# Patient Record
(demographics unavailable — no encounter records)

---

## 2024-11-05 NOTE — HISTORY OF PRESENT ILLNESS
[FreeTextEntry1] : Patient is 46 years old para 2-0-0-2 whose  LMP was 10/23/2024.Patient has been cared for by Dr. Arguelles.  Patient reported normal menstruation until this year when her periods became heavier and also the interval at time can also be shorter. Patient reported no pain  Patient had an IUD 6/2024 mirena.  She is no longer anemic. An endometrial biopsy was done 4/11/2024 showing scant strips of inactive endometrium and predominant fresh blood  A sonogram 6/10/24 showed a uterus 9.1x6.2x6.1cm and a 4x3.7x3.8cm size mass that appeared to be intramural fibroid vs submucosal fibroid.  Ovaries were wnl .  Small amount of fluid was around the uterus.  HCM Mammogram 2024 Pap smear 2024 NILM HPV- Colonoscopy 2021 for GI discomfort  no abnormality noted

## 2024-11-05 NOTE — ASSESSMENT
[FreeTextEntry1] : The patient has a significant history of menorrhagia with associated anemia which has recovered with iron pills.  Sonogram evaluation indicated a possible submucosal lesion versus a intramural lesion that could be a fibroid.  There is a significant difference between the 2.  I recommend that the patient gets an MRI of the pelvis to further assess this Introl uterine mass.  I spoke about the finding of a intramural fibroid versus a submucosal fibroid.  I illustrated the submucosa 1 which could be entirely on the lining of the endometrium versus partially emerge into the myometrium.  Excision of such lesion can be somewhat difficult.  We will assess after we have the results of the MRI.  The patient is in agreement with the plan.  She will return to the office after compeltion of the study and after we had a chance to review it.

## 2024-11-05 NOTE — LETTER BODY
[Attached please find my note.] : Attached please find my note. [FreeTextEntry2] : Brinda Arguelles [place] [address] [facility] [address] [place] [address] Tel [phone] [phone]  Dear Dr Arguelles   Ms Dorothy Yu was seen in my office for a consultation regarding her menorrhagia.  Please see my consult note for her plan of care.  Thank you for allowing me to participate in the care of this patient.   Please do not hesitate to call if you have any questions.    Most Sincerely,     Josh Ramirez MD [facility] Director, [facility] Professor of Obstetrics and Gynecology, [employer] [facility] [employer] at [facility] Division of Gynecologic Oncology Department Obstetrics and Gynecology Tel [phone] [email or web address]

## 2024-11-05 NOTE — PHYSICAL EXAM
[Chaperone Present] : A chaperone was present in the examining room during all aspects of the physical examination [57748] : A chaperone was present during the pelvic exam. [Absent] : CVAT: absent [Normal] : Anus and perineum: Normal sphincter tone, no masses, no prolapse. [Fully active, able to carry on all pre-disease performance without restriction] : Status 0 - Fully active, able to carry on all pre-disease performance without restriction [FreeTextEntry2] : Mralen [de-identified] : RV normal size

## 2025-01-24 NOTE — HISTORY OF PRESENT ILLNESS
[FreeTextEntry1] : 47 year old presents for a consultation regarding fibroids. Pt reports heavy periods. She states that she once had to go to the ER for heavy bleeding a few hours after it began.  Pt got a Mirena IUD in June. In July she had a heavy period for 3 days. Her next period was 8/28. Then, in September she had some spotting. On 10/20 she got a very heavy period that lasted 7 days. States that since then it has been pretty light until 2 days ago when she started her period which is not as heavy. Pt previously had the copper IUD for 7 years.  Pelvic MRI:  Uterus: Retroflexed uterus measured 8.4 x 5.5 x 5 cm with a 3.3 x 2.5 anterior wall intramural-submucosal enhancing fibroid about half protruding into the cavity (FIGO 2) and tiny cavity fluid above and below, otherwise normal surrounding endometrial stripe. No additional fibroid or adenomyosis. Diffusely hypo enhancing, T2 hypointense cervix suggestive of fibrotic change, with well-demarcated transition to enhancing uterine body myometrium Vagina: Unremarkable Adnexa: No adnexal lesion. Small ovaries with a tiny cystic focus suggestive of residual follicle, correlate clinically or lab to assess for ovarian functional/perimenopausal status, given menstrual history. 1.5 x 1.1 cm right ovarian T1 hyperintense hemorrhagic cyst with minimal T2 low signal suggestive of hemorrhagic functional cyst vs. endometrioma Bladder/urethra: Unremarkable GI: Nondilated bowl Peritoneum/retroperitoneum: No adenopathy or ascites Musculoskeletal structure: Unremarkable  Impression: Single anterior wall fibroid, 3.3 cm, protruding into the cavity (FIGO 2), see above. 1.5 cm right ovarian hemorrhagic functional cyst vs. endometrioma, compare with prior ultrasound if variable or follow up imaging to assess for interval change

## 2025-01-24 NOTE — END OF VISIT
[FreeTextEntry3] : I, Madina Moody, acted as a scribe on behalf of Dr. Chelsie Ramires M.D. on 01/23/2025.   All medical entries made by the scribe were at my, Dr. Chelsie Ramires M.D., direction and personally dictated by me on 01/23/2025. I have reviewed the chart and agree that the record accurately reflects my personal performance of the history, physical exam, assessment and plan. I have also personally directed, reviewed, and agreed with the chart.

## 2025-01-24 NOTE — PLAN
[FreeTextEntry1] : 47 year old for a consultation.  -Discussed the option of continued conservative observation of fibroids vs surgical removal. Treatment options of myomectomy, hysterectomy, ufe, hysteroscopy, sonata and continued observation were also outlined. A detailed discussion regarding the option of myomectomy vs hysterectomy was also held and the risk, benefits, and alternatives provided. All questions answered and pt to notify. -discussed oprative hysterosocpy, resection of submucous fibroid, dilation and curettage. would want sooner than later. wants to think about it before scheduling.  -discussed possible two part procedure.